# Patient Record
(demographics unavailable — no encounter records)

---

## 2025-07-18 NOTE — HISTORY OF PRESENT ILLNESS
[Hepatitis B] : Hepatitis B [FreeTextEntry1] : non immune to Hep B series, shadowing rehab medicine, needs series

## 2025-07-18 NOTE — PLAN
[FreeTextEntry1] : hep B series restarted for non immune status  note given for follow up shots, #2 dose 8/18/25 or after, #3 dose 11/18/25 or after